# Patient Record
Sex: FEMALE | Race: WHITE | NOT HISPANIC OR LATINO | URBAN - METROPOLITAN AREA
[De-identification: names, ages, dates, MRNs, and addresses within clinical notes are randomized per-mention and may not be internally consistent; named-entity substitution may affect disease eponyms.]

---

## 2018-02-16 ENCOUNTER — OFFICE VISIT (OUTPATIENT)
Dept: RETAIL CLINIC | Facility: CLINIC | Age: 33
End: 2018-02-16

## 2018-02-16 VITALS
RESPIRATION RATE: 18 BRPM | OXYGEN SATURATION: 98 % | HEART RATE: 117 BPM | SYSTOLIC BLOOD PRESSURE: 102 MMHG | DIASTOLIC BLOOD PRESSURE: 75 MMHG | TEMPERATURE: 98.4 F

## 2018-02-16 DIAGNOSIS — J11.1 INFLUENZA: ICD-10-CM

## 2018-02-16 DIAGNOSIS — J40 BRONCHITIS: Primary | ICD-10-CM

## 2018-02-16 LAB
EXPIRATION DATE: NORMAL
FLUAV AG NPH QL: NEGATIVE
FLUBV AG NPH QL: NEGATIVE
INTERNAL CONTROL: NORMAL
Lab: NORMAL

## 2018-02-16 PROCEDURE — 87804 INFLUENZA ASSAY W/OPTIC: CPT | Performed by: NURSE PRACTITIONER

## 2018-02-16 PROCEDURE — 99203 OFFICE O/P NEW LOW 30 MIN: CPT | Performed by: NURSE PRACTITIONER

## 2018-02-16 RX ORDER — PREDNISONE 20 MG/1
20 TABLET ORAL 2 TIMES DAILY
Qty: 14 TABLET | Refills: 0 | Status: SHIPPED | OUTPATIENT
Start: 2018-02-16 | End: 2018-02-23

## 2018-02-16 RX ORDER — OSELTAMIVIR PHOSPHATE 75 MG/1
75 CAPSULE ORAL 2 TIMES DAILY
Qty: 10 CAPSULE | Refills: 0 | Status: SHIPPED | OUTPATIENT
Start: 2018-02-16 | End: 2018-02-21

## 2018-02-16 NOTE — PROGRESS NOTES
Subjective   Ruth HALE is a 32 y.o. female.     HPI Comments: Patient received influenza vaccination this season.     Flu Symptoms   This is a new problem. The current episode started yesterday. The problem occurs constantly. The problem has been unchanged. Associated symptoms include anorexia, chills, congestion, coughing, diaphoresis, fatigue, a fever (tmax 100.9 this AM-took ibuprofen), headaches, myalgias, nausea and weakness. Pertinent negatives include no abdominal pain, change in bowel habit, chest pain, neck pain, sore throat, swollen glands, urinary symptoms, vertigo or vomiting. The symptoms are aggravated by exertion. She has tried NSAIDs (mucinex) for the symptoms. The treatment provided mild relief.       The following portions of the patient's history were reviewed and updated as appropriate: allergies, current medications, past family history, past medical history, past social history, past surgical history and problem list.    Review of Systems   Constitutional: Positive for appetite change (dimnished), chills, diaphoresis, fatigue and fever (tmax 100.9 this AM-took ibuprofen).   HENT: Positive for congestion and postnasal drip. Negative for ear discharge, ear pain, facial swelling, hearing loss, mouth sores, nosebleeds, rhinorrhea, sinus pressure, sneezing, sore throat, trouble swallowing and voice change.    Eyes: Negative for pain, discharge, redness and itching.   Respiratory: Positive for cough and chest tightness. Negative for shortness of breath, wheezing and stridor.    Cardiovascular: Negative for chest pain and palpitations.   Gastrointestinal: Positive for anorexia and nausea. Negative for abdominal pain, change in bowel habit, constipation, diarrhea and vomiting.   Genitourinary: Negative for decreased urine volume.   Musculoskeletal: Positive for myalgias. Negative for neck pain and neck stiffness.   Skin: Negative for color change.   Allergic/Immunologic: Positive for environmental  allergies. Negative for immunocompromised state.   Neurological: Positive for weakness and headaches. Negative for dizziness, vertigo and syncope.       Objective   Physical Exam   Constitutional: She is oriented to person, place, and time. She appears well-developed and well-nourished. She is cooperative.  Non-toxic appearance. She appears ill. No distress.   HENT:   Right Ear: Hearing, tympanic membrane, external ear and ear canal normal.   Left Ear: Hearing, tympanic membrane, external ear and ear canal normal.   Nose: Rhinorrhea present. No mucosal edema. Right sinus exhibits no maxillary sinus tenderness and no frontal sinus tenderness. Left sinus exhibits no maxillary sinus tenderness and no frontal sinus tenderness.   Mouth/Throat: Uvula is midline and mucous membranes are normal. Mucous membranes are not pale, not dry and not cyanotic. No oral lesions. No uvula swelling. Posterior oropharyngeal erythema present. No oropharyngeal exudate or posterior oropharyngeal edema. Tonsils are 2+ on the right. Tonsils are 2+ on the left. No tonsillar exudate.   Right tympanostomy tube intact    Eyes: Conjunctivae and lids are normal.   Cardiovascular: Regular rhythm, S1 normal and S2 normal.  Tachycardia present.    Pulmonary/Chest: Effort normal. She has no decreased breath sounds. She has wheezes in the right lower field and the left lower field. She has rhonchi in the right upper field. She has no rales.   Abdominal: Bowel sounds are normal. There is no tenderness.   Lymphadenopathy:     She has no cervical adenopathy.   Neurological: She is alert and oriented to person, place, and time.   Skin: Skin is warm. She is diaphoretic. No pallor.   Vitals reviewed.      Assessment/Plan   Ruth was seen today for flu symptoms.    Diagnoses and all orders for this visit:    Bronchitis  -     predniSONE (DELTASONE) 20 MG tablet; Take 1 tablet by mouth 2 (Two) Times a Day for 7 days.    Influenza  -     oseltamivir  (TAMIFLU) 75 MG capsule; Take 1 capsule by mouth 2 (Two) Times a Day for 5 days.  -     POC Influenza A / B          -     Symptomatic care: rest, increase H2O intake, tylenol/ibuprofen OTC for pain/fever relief        -     May continue to use mucinex OTC         -     Follow up with PCP or urgent treatment for persistent or worsening symptoms     Lab Results (last 24 hours)     Procedure Component Value Units Date/Time    POC Influenza A / B [[id number]] Collected:  02/16/18 0848    Specimen:  Swab Updated:  02/16/18 0849     Rapid Influenza A Ag negative     Rapid Influenza B Ag negative     Internal Control Passed     Lot Number [id number]     Expiration Date 11/8/20

## 2020-03-09 ENCOUNTER — AMBULATORY SURGICAL CENTER (OUTPATIENT)
Dept: URBAN - METROPOLITAN AREA SURGERY 20 | Facility: SURGERY | Age: 35
End: 2020-03-09
Payer: COMMERCIAL

## 2020-03-09 DIAGNOSIS — Z83.71 FAMILY HISTORY OF COLONIC POLYPS: ICD-10-CM

## 2020-03-09 PROCEDURE — 45378 DIAGNOSTIC COLONOSCOPY: CPT | Mod: 33 | Performed by: INTERNAL MEDICINE

## 2023-03-27 ENCOUNTER — AMBULATORY SURGICAL CENTER (OUTPATIENT)
Dept: URBAN - METROPOLITAN AREA SURGERY 20 | Facility: SURGERY | Age: 38
End: 2023-03-27
Payer: COMMERCIAL

## 2023-03-27 DIAGNOSIS — Z12.11 ENCOUNTER FOR SCREENING FOR MALIGNANT NEOPLASM OF COLON: ICD-10-CM

## 2023-03-27 DIAGNOSIS — Z83.71 FAMILY HISTORY OF COLONIC POLYPS: ICD-10-CM

## 2023-03-27 PROCEDURE — 45378 DIAGNOSTIC COLONOSCOPY: CPT | Mod: 33 | Performed by: INTERNAL MEDICINE
